# Patient Record
Sex: MALE | Race: BLACK OR AFRICAN AMERICAN
[De-identification: names, ages, dates, MRNs, and addresses within clinical notes are randomized per-mention and may not be internally consistent; named-entity substitution may affect disease eponyms.]

---

## 2018-09-30 ENCOUNTER — HOSPITAL ENCOUNTER (INPATIENT)
Dept: HOSPITAL 74 - YASAS | Age: 63
LOS: 1 days | Discharge: LEFT BEFORE DISCHARGE | DRG: 770 | End: 2018-10-01
Attending: INTERNAL MEDICINE | Admitting: INTERNAL MEDICINE
Payer: COMMERCIAL

## 2018-09-30 VITALS — BODY MASS INDEX: 21.8 KG/M2

## 2018-09-30 DIAGNOSIS — R00.0: ICD-10-CM

## 2018-09-30 DIAGNOSIS — F17.213: ICD-10-CM

## 2018-09-30 DIAGNOSIS — F10.230: Primary | ICD-10-CM

## 2018-09-30 DIAGNOSIS — F19.24: ICD-10-CM

## 2018-09-30 DIAGNOSIS — I10: ICD-10-CM

## 2018-09-30 DIAGNOSIS — D57.3: ICD-10-CM

## 2018-09-30 DIAGNOSIS — F32.9: ICD-10-CM

## 2018-09-30 DIAGNOSIS — F14.20: ICD-10-CM

## 2018-09-30 DIAGNOSIS — Z59.0: ICD-10-CM

## 2018-09-30 PROCEDURE — HZ2ZZZZ DETOXIFICATION SERVICES FOR SUBSTANCE ABUSE TREATMENT: ICD-10-PCS | Performed by: INTERNAL MEDICINE

## 2018-09-30 SDOH — ECONOMIC STABILITY - HOUSING INSECURITY: HOMELESSNESS: Z59.0

## 2018-09-30 NOTE — HP
Screened but not Admitted





- Documentation of Visit


Screened but not Admitted: No


Left Prior to Completion of Assessment: No


Insurance Authorization Denied: No


Patient Does Not Meet Criteria for Admission: No


Level of Care Recommended at this Time: Other


Additional Information/Explanation: Patient is seeking admission to detox from 

alcohol. He reports that he was in patient at [facility] for 2 days. 

Patient was brought in by [facility] with intravenous access on his left arm. Unable 

to verify with [facility] because access was not dated, signed or secured. Patient 

was picked up by [facility] to go back to [facility] and remove access.

## 2018-09-30 NOTE — HP
CIWA Score





- CIWA Score


Nausea/Vomiting: 3 (x 8)


Muscle Tremors: 4-Moderate,w/Arms Extend


Anxiety: 4-Mod. Anxious/Guarded


Agitation: 0-Normal Activity


Paroxysmal Sweats: 2


Orientation: 1-Uncertain about Date


Tacttile Disturbances: 0-None


Auditory Disturbances: 0-None


Visual Disturbances: 0-None


Headache: 3-Moderate


CIWA-Ar Total Score: 17





Admission ROS [facility]





- HPI


Chief Complaint: 





Alcohol withdrawal symptoms


Allergies/Adverse Reactions: 


 Allergies











Allergy/AdvReac Type Severity Reaction Status Date / Time


 


Pork/Porcine Containing Allergy   Verified 18 23:36





Products     











History of Present Illness: 





63 years old male with a long history of alcohol dependence is seeking 

admission to detox. Patient has been in detox at [facility] and reports 9 years of 

sobriety. He has medical history of hypertension, Headaches and depression. He 

denies suicide attempt and suicidal ideation  at this time.


Exam Limitations: No Limitations





- Ebola screening


Have you traveled outside of the country in the last 21 days: No (N)


Have you had contact with anyone from an Ebola affected area: No


Have you been sick,other than usual withdrawal symptoms: No


Do you have a fever: No





- Review of Systems


Constitutional: Chills, Malaise, Night Sweats, Weakness


EENT: reports: Blurred Vision


Respiratory: reports: No Symptoms reported


Cardiac: reports: No Symptoms Reported


GI: reports: Poor Appetite, Poor Fluid Intake, Vomiting, Abdominal cramping


: reports: No Symptoms Reported


Musculoskeletal: reports: Back Pain, Joint Pain, Muscle Pain


Integumentary: reports: Bruising, Dryness


Neuro: reports: Headache, Tremors


Endocrine: reports: Flushing


Hematology: reports: No Symptoms Reported


Psychiatric: reports: Anxious, Depressed


Other Systems: Reviewed and Negative





Patient History





- Patient Medical History


Hx Anemia: Yes (SICKLE CELL TRAIT)


Hx Asthma: No


Hx Chronic Obstructive Pulmonary Disease (COPD): No


Hx Cancer: No


Hx Cardiac Disorders: No


Hx Congestive Heart Failure: No


Hx Hypertension: Yes (Not on medication)


Hx Hypercholesterolemia: No


Hx Pacemaker: No


HX Cerebrovascular Accident: No


Hx Seizures: No


Hx Dementia: No


Hx Diabetes: No


Hx Gastrointestinal Disorders: No


Hx Liver Disease: No


Hx Genitourinary Disorders: No


Hx Sexually Transmitted Disorders: No


Hx Renal Disease (ESRD): No


Hx Thyroid Disease: No


Hx Human Immunodeficiency Virus (HIV): No (Negative 2018)


Hx Hepatitis C: No


Hx Depression: Yes (Not on medication)


Hx Suicide Attempt: No (Denies suicide attempt and suicidal ideation at this 

time)


Hx Bipolar Disorder: No


Hx Schizophrenia: No


Other Medical History: Headaches - Not on medication





- Patient Surgical History


Past Surgical History: No


Hx Neurologic Surgery: No


Hx Cataract Extraction: No


Hx Cardiac Surgery: No


Hx Lung Surgery: No


Hx Breast Surgery: No


Hx Breast Biopsy: No


Hx Abdominal Surgery: No


Hx Appendectomy: No


Hx Cholecystectomy: No


Hx Genitourinary Surgery: No


Hx  Section: No


Hx Orthopedic Surgery: No


Anesthesia Reaction: No





- PPD History


Previous Implant?: No (POSTIVE PPD. INH FOR 9 MONTHS )


Documented Results: Negative w/proof





- Reproductive History


Patient is a Female of Child Bearing Age (11 -55 yrs old): No (male)





- Smoking Cessation


Smoking history: Current every day smoker


Have you smoked in the past 12 months: Yes


Aproximately how many cigarettes per day: 10


Hx Chewing Tobacco Use: No


Initiated information on smoking cessation: Yes


'Breaking Loose' booklet given: 10/01/18





Family Disease History





- Family Disease History


Family Disease History: Diabetes: Mother (HTN), Heart Disease: Mother, Other: 

Father (ALCOHOLIC, OF CIRRHOSIS)





Admission Physical Exam [facility]





- Vital Signs


Vital Signs: 


 Vital Signs - 24 hr











  18





  22:12


 


Temperature 97.2 F L


 


Pulse Rate 120 H


 


Respiratory 22 H





Rate 


 


Blood Pressure 151/90














- Physical


General Appearance: Yes: Moderate Distress, Tremorous, Irritable, Sweating, 

Anxious


HEENTM: Yes: EOMI, Normal ENT Inspection, Normocephalic, Normal Voice, ANU


Respiratory: Yes: Lungs Clear, Normal Breath Sounds, No Respiratory Distress


Neck: Yes: Supple


Breast: Yes: Breast Exam Deferred


Cardiology: Yes: Tachycardia


Abdominal: Yes: Normal Bowel Sounds, Soft


Genitourinary: Yes: Within Normal Limits


Back: Yes: Normal Inspection


Musculoskeletal: Yes: Back pain, Joint Stiffness, Muscle Pain


Extremities: Yes: Tremors


Neurological: Yes: Alert, Normal Mood/Affect


Integumentary: Yes: Warm


Lymphatic: Yes: Within Normal Limits





- Diagnostic


(1) Alcohol dependence with uncomplicated withdrawal


Current Visit: Yes   Status: Chronic   





(2) Depression


Current Visit: Yes   Status: Chronic   


Qualifiers: 


   Depression Type: unspecified   Qualified Code(s): F32.9 - Major depressive 

disorder, single episode, unspecified   





(3) HTN (hypertension)


Current Visit: No   Status: Suspected   


Qualifiers: 


   Hypertension type: essential hypertension   Qualified Code(s): I10 - 

Essential (primary) hypertension   





(4) Nicotine dependence


Current Visit: No   Status: Acute   





Cleared for Admission [facility]





- Detox or Rehab


[facility] Level of Care: Medically Managed


Detox Regimen/Protocol: Librium





[facility] Breath Alcohol Content


Breath Alcohol Content: 0





Urine Drug Screen





- Results


Drug Screen Negative: No


Urine Drug Screen Results: THIERNO-Cocaine, BZO-Benzodiazepines

## 2018-10-01 VITALS — SYSTOLIC BLOOD PRESSURE: 133 MMHG | DIASTOLIC BLOOD PRESSURE: 71 MMHG | TEMPERATURE: 98.1 F | HEART RATE: 96 BPM

## 2018-10-01 NOTE — CONSULT
[facility] Psychiatric Consult





- Data


Date of interview: 10/01/18


Admission source: [facility]


Identifying data: Patient is a 63 year old single male. Patient refusing to 

provided additional identifying data.


Substance Abuse History: - Smoking Cessation.  Smoking history: Current every 

day smoker.  Have you smoked in the past 12 months: Yes.  Aproximately how many 

cigarettes per day: 10.  Hx Chewing Tobacco Use: No.  Initiated information on 

smoking cessation: Yes.  'Breaking Loose' booklet given: 10/01/18


Medical History: Anemia (sickle cell trait), Positive PPD. INH for 9 months


Psychiatric History: Patient irritable. He denies h/o psychiatric 

hospitalization, outpatient psychiatric care and suicide attempt.


Physical/Sexual Abuse/Trauma History: denies.





Mental Status Exam





- Mental Status Exam


Alert and Oriented to: Time, Place, Person


Cognitive Function: Good


Patient Appearance: Well Groomed


Mood: Irritable


Affect: Mood Congruent


Patient Behavior: Agitated


Speech Pattern: Clear


Voice Loudness: Normal


Thought Process: Intact, Goal Oriented


Thought Disorder: Not Present


Hallucinations: Denies


Suicidal Ideation: Denies


Homicidal Ideation: Denies


Insight/Judgement: Poor


Sleep: Poorly


Appetite: Fair


Muscle strength/Tone: Normal


Gait/Station: Normal





Psychiatric Findings





- Problem List (Axis 1, 2,3)


(1) Cocaine dependence


Current Visit: Yes   Status: Acute   





(2) Substance induced mood disorder


Current Visit: Yes   Status: Acute   





(3) Alcohol dependence with uncomplicated withdrawal


Current Visit: Yes   Status: Acute   





- Initial Treatment Plan


Initial Treatment Plan: Psychoeducation provided. Detoxification in progress. 

Observation.

## 2018-10-01 NOTE — PN
[facility] Progress Note (SOAP)


Subjective: 





sweat tremor restlessness


no abscess noted  patient lives in [place] with his wife


Objective: 





10/01/18 12:40


 Vital Signs











Temperature  98.1 F   10/01/18 09:15


 


Pulse Rate  96 H  10/01/18 09:15


 


Respiratory Rate  18   10/01/18 09:15


 


Blood Pressure  133/71   10/01/18 09:15


 


O2 Sat by Pulse Oximetry (%)      











10/01/18 12:41


lab noted

## 2018-10-01 NOTE — DS
[facility] Detox Discharge Summary


Admission Date: 


09/30/18





Discharge Date: 10/01/18





- History


Present History: Alcohol Dependence


Additional Comments: 





63 years old male admitted o 9/30/18 for alcohol withdrawal sx


patient insists to leave the detox unit wants to go home to his wife


patient does not have abscess patient  stated that he does not use opioid but 

alcohol


patient reported that he does no need detox he wants to go to rehab





- Physical Exam Results


Vital Signs: 


 Vital Signs











Temperature  98.1 F   10/01/18 09:15


 


Pulse Rate  96 H  10/01/18 09:15


 


Respiratory Rate  18   10/01/18 09:15


 


Blood Pressure  133/71   10/01/18 09:15


 


O2 Sat by Pulse Oximetry (%)      











Pertinent Admission Physical Exam Findings: 





alcohol withdrawal sx


 Vital Signs











Temperature  98.1 F   10/01/18 09:15


 


Pulse Rate  96 H  10/01/18 09:15


 


Respiratory Rate  18   10/01/18 09:15


 


Blood Pressure  133/71   10/01/18 09:15


 


O2 Sat by Pulse Oximetry (%)      








lab noted available





- Treatment


Hospital Course: Detox Protocol Followed, Responded well


Patient has Accepted a Rehab Referral to: [facility]





- Medication


Discharge Medications: 


Ambulatory Orders





Nifedipine ER [Procardia XL -] 30 mg PO DAILY #30 [id number] 08/24/15 











- Diagnosis


(1) Nicotine dependence


Current Visit: Yes   Status: Acute   


Qualifiers: 


   Nicotine product type: cigarettes   Substance use status: in withdrawal   

Qualified Code(s): F17.213 - Nicotine dependence, cigarettes, with withdrawal   





(2) HTN (hypertension)


Current Visit: Yes   Status: Chronic   


Qualifiers: 


   Hypertension type: essential hypertension   Qualified Code(s): I10 - 

Essential (primary) hypertension   





(3) Alcohol dependence with uncomplicated withdrawal


Current Visit: Yes   Status: Acute   





- AMA


Did Patient Leave Against Medical Advice: No

## 2018-10-02 NOTE — EKG
Test Reason : 

Blood Pressure : ***/*** mmHG

Vent. Rate : 097 BPM     Atrial Rate : 097 BPM

   P-R Int : 122 ms          QRS Dur : 080 ms

    QT Int : 366 ms       P-R-T Axes : 060 -11 014 degrees

   QTc Int : 464 ms

 

NORMAL SINUS RHYTHM

NORMAL ECG

NO PREVIOUS ECGS AVAILABLE

Confirmed by Santosh Thorpe MD ([id number]) on 10/2/2018 11:07:23 AM

 

Referred By:             Confirmed By:Santosh Thorpe MD

## 2019-07-29 ENCOUNTER — HOSPITAL ENCOUNTER (INPATIENT)
Dept: HOSPITAL 74 - YASAS | Age: 64
LOS: 2 days | Discharge: LEFT BEFORE DISCHARGE | DRG: 770 | End: 2019-07-31
Attending: SURGERY | Admitting: SURGERY
Payer: COMMERCIAL

## 2019-07-29 VITALS — BODY MASS INDEX: 21.5 KG/M2

## 2019-07-29 DIAGNOSIS — I10: ICD-10-CM

## 2019-07-29 DIAGNOSIS — D57.3: ICD-10-CM

## 2019-07-29 DIAGNOSIS — F17.213: ICD-10-CM

## 2019-07-29 DIAGNOSIS — C61: ICD-10-CM

## 2019-07-29 DIAGNOSIS — F19.24: ICD-10-CM

## 2019-07-29 DIAGNOSIS — F19.282: ICD-10-CM

## 2019-07-29 DIAGNOSIS — F10.230: Primary | ICD-10-CM

## 2019-07-29 PROCEDURE — HZ2ZZZZ DETOXIFICATION SERVICES FOR SUBSTANCE ABUSE TREATMENT: ICD-10-PCS | Performed by: ALLERGY & IMMUNOLOGY

## 2019-07-29 NOTE — HP
<Micaela Hernandez - Last Filed: 19 13:12>





CIWA Score


Nausea/Vomitin-Mild Nausea/No Vomiting


Muscle Tremors: 2


Anxiety: 2


Agitation: 1-Slight > Activity


Paroxysmal Sweats: 2


Orientation: 0-Oriented


Tacttile Disturbances: 1-Very Mild Itch/Numbness


Auditory Disturbances: 0-None


Visual Disturbances: 0-None


Headache: 3-Moderate


CIWA-Ar Total Score: 12





- Admission Criteria


[facility]AS Guidelines: Admission for Medically Managed Detox: 


Requires at least one of the followin. CIWA greater than 12


2. Seizures within the past 24 hours


3. Delirium tremens within the past 24 hours


4. Hallucinations within the past 24 hours


5. Acute intervention needed for co  occurring medical disorder


6. Acute intervention needed for co  occurring psychiatric disorder


7. Severe withdrawal that cannot be handled at a lower level of care (continued


    vomiting, continued diarrhea, abnormal vital signs) requiring intravenous


    medication and/or fluids


8. Pregnancy








Admission ROS [facility]





- [facility]


Chief Complaint: 


65 y/o M with PMH prostate CA, HTN, depression, sickle cell trait, who presents 

for alcohol detox. 


Allergies/Adverse Reactions: 


 Allergies











Allergy/AdvReac Type Severity Reaction Status Date / Time


 


Pork/Porcine Containing Allergy   Verified 19 10:52





Products     











History of Present Illness: 


65 y/o M with prostate CA, HTN, depression, sickle cell trait, who presents for 

alcohol detox. Per pt, he was at [facility] yesterday for alcohol detox (

received 1 dose of librium). While there, he had chest pain and generalized 

abdominal pain, thus underwent a CTAP. Was found to subsequently have prostate 

CA. He was told that he would need sx. States that he was referred to multiple 

centers for detox, but picked to come here. Last drink was 3 days ago. He had 1 

L of vodka. Drinks this amount daily. Has blacked out previously, however has 

never had alcohol withdrawal sz. Longest duration sobriety 2 days. When pt 

withdraws, he becomes nauseated, vomits, and becomes tremulous. Has drank for 

35 yrs because he "likes it." Not to mask any feelings, but does endorse 

increased stress in his life. Used cocaine 10 days ago, < $10 worth via 

smoking. Was used 1x, does not use regularly. Denies IVDA. No other drug use.





Was at detox at [facility] , , 2018.


Pt's goal is to complete alcohol detox and then have his prostate CA addressed 

via sx, oncology f/u. 





PMH: as above


PsxH: denies


meds: denies


allergies: pork/pork containing products - causes HTN


FH: sister- asthmatic (w/hx intubation), mother - alzheimer's dz


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 smokes 1/2 

ppd x 30 yrs. alcohol and cocaine use as above. denies other drug use.


Exam Limitations: No Limitations





- Ebola screening


Have you traveled outside of the country in the last 21 days: No


Have you had contact with anyone from an Ebola affected area: No


Have you been sick,other than usual withdrawal symptoms: No


Do you have a fever: No





- Review of Systems


Constitutional: Chills, Fever, Night Sweats, Unintentional Wgt. Loss (+15 pound 

weight loss since february)


EENT: reports: Blurred Vision


Respiratory: reports: Shortness of Breath


Cardiac: reports: Chest Tightness


GI: reports: Diarrhea, Nausea, Poor Appetite


: reports: No Symptoms Reported


Musculoskeletal: reports: No Symptoms Reported


Integumentary: reports: No Symptoms Reported


Neuro: reports: No Symptoms reported


Endocrine: reports: No Symptoms Reported


Hematology: reports: No Symptoms Reported


Psychiatric: reports: Orientated x3





Patient History





- Patient Medical History


Hx Anemia: Yes (SICKLE CELL TRAIT)


Hx Asthma: No


Hx Chronic Obstructive Pulmonary Disease (COPD): No


Hx Cancer: No


Hx Cardiac Disorders: No


Hx Congestive Heart Failure: No


Hx Hypertension: Yes (Not on medication)


Hx Hypercholesterolemia: No


Hx Pacemaker: No


HX Cerebrovascular Accident: No


Hx Seizures: No


Hx Dementia: No


Hx Diabetes: No


Hx Gastrointestinal Disorders: No


Hx Liver Disease: No


Hx Genitourinary Disorders: No


Hx Sexually Transmitted Disorders: No


Hx Renal Disease (ESRD): No


Hx Thyroid Disease: No


Hx Human Immunodeficiency Virus (HIV): No (Negative )


Hx Hepatitis C: No


Hx Depression: Yes (Not on medication)


Hx Suicide Attempt: No (Denies suicide attempt and suicidal ideation at this 

time)


Hx Bipolar Disorder: No


Hx Schizophrenia: No





- Patient Surgical History


Past Surgical History: No


Hx Neurologic Surgery: No


Hx Cataract Extraction: No


Hx Cardiac Surgery: No


Hx Lung Surgery: No


Hx Breast Surgery: No


Hx Breast Biopsy: No


Hx Abdominal Surgery: No


Hx Appendectomy: No


Hx Cholecystectomy: No


Hx Genitourinary Surgery: No


Hx  Section: No


Hx Orthopedic Surgery: No


Anesthesia Reaction: No





- PPD History


Documented Results: Positive w/o proof


PPD to be Administered?: No





- Reproductive History


Patient is a Female of Child Bearing Age (11 -55 yrs old): No





- Smoking Cessation


Smoking history: Current every day smoker


Have you smoked in the past 12 months: Yes


Aproximately how many cigarettes per day: 10


Hx Chewing Tobacco Use: No


Initiated information on smoking cessation: Yes


'Breaking Loose' booklet given: 19





- Substance & Tx. History


Hx Alcohol Use: Yes


Substance Use Type: Alcohol, Cocaine


Hx Substance Use Treatment: Yes ([facility] detox , , 2018 )





- Substances abused


  ** Alcohol


Substance route: Oral


Frequency: Daily


Amount used: 1 liter of vodka


Age of first use: 27


Date of last use: 19





  ** Cocaine


Substance route: Smoking


Frequency: 1-3 times last 30 days


Amount used: < 10 dollars worth 


Date of last use: 19





Family Disease History





- Family Disease History


Family Disease History: Diabetes: Mother (HTN, alzheimer's ), Heart Disease: 

Mother, Other: Father (ALCOHOLIC, OF CIRRHOSIS), Sister (sister, asthmatic)





Admission Physical Exam [facility]





- Vital Signs


Vital Signs: 


 Vital Signs - 24 hr











  19





  10:54 11:43


 


Temperature 97.1 F L 97.1 F L


 


Pulse Rate 84 84


 


Respiratory 18 18





Rate  


 


Blood Pressure 139/83 139/83














- Physical


General Appearance: Yes: Within Normal Limits, Other


HEENTM: Yes: Other (+lipoma on R forehead)


Respiratory: Yes: Wheezing, Expiration (+prolonged expiratory phase)


Neck: Yes: Supple


Breast: Yes: Breast Exam Deferred


Cardiology: Yes: Regular Rhythm, Regular Rate, S1, S2


Abdominal: Yes: Tenderness


Genitourinary: Yes: Within Normal Limits


Back: Yes: Within Normal Limits


Musculoskeletal: Yes: Within Normal Limits


Extremities: Yes: Within Normal Limits


Neurological: Yes: CNs II-XII NML intact


Integumentary: Yes: Within Normal Limits





Cleared for Admission S





- Detox or Rehab


[facility] Level of Care: Medically Managed


Detox Regimen/Protocol: Librium





Breathalyzer





- Breathalyzer


Breathalyzer: 0





Urine Drug Screen





- Test Device


Lot number: [id number]


Expiration date: 21





- Control


Is test valid?: Yes





- Results


Drug screen NEGATIVE: No


Urine drug screen results: BZO-Benzodiazepines





Inpatient Rehab Admission





- Rehab Decision to Admit


Inpatient rehab admission?: No





<Caden Dash - Last Filed: 19 14:07>





CIWA Score





- Admission Criteria


[facility] Guidelines: Admission for Medically Managed Detox: 


Requires at least one of the followin. CIWA greater than 12


2. Seizures within the past 24 hours


3. Delirium tremens within the past 24 hours


4. Hallucinations within the past 24 hours


5. Acute intervention needed for co  occurring medical disorder


6. Acute intervention needed for co  occurring psychiatric disorder


7. Severe withdrawal that cannot be handled at a lower level of care (continued


    vomiting, continued diarrhea, abnormal vital signs) requiring intravenous


    medication and/or fluids


8. Pregnancy








Admission Physical Exam [facility]





- Vital Signs


Vital Signs: 


 Vital Signs - 24 hr











  19





  10:54 11:43


 


Temperature 97.1 F L 97.1 F L


 


Pulse Rate 84 84


 


Respiratory 18 18





Rate  


 


Blood Pressure 139/83 139/83

## 2019-07-29 NOTE — PN
[facility]HS Progress Note


Note: 





Patient was reviewed with resident and qualified for detox.  


See full history and assessment.  All labs reviewed and medications reviewed 

and appropriate.


Dr. Dash

## 2019-07-30 RX ADMIN — Medication SCH TAB: at 10:20

## 2019-07-30 NOTE — PN
S CIWA





- CIWA Score


Nausea/Vomitin


Muscle Tremors: 3


Anxiety: 2


Agitation: 0-Normal Activity


Paroxysmal Sweats: No Perspiration


Orientation: 0-Oriented


Tacttile Disturbances: 1-Very Mild Itch/Numbness


Auditory Disturbances: 0-None


Visual Disturbances: 1-Very Mild Sensitivity


Headache: 3-Moderate


CIWA-Ar Total Score: 15





BHS Progress Note (SOAP)


Subjective: 





Tremors, H/A, Stomach Cramping, Vomiting.


Objective: 


PATIENT A & O X 3, OBSERVED AMBULATING ON UNIT UNASSISTED. IN NO ACUTE DISTRESS.





19 14:31





 Vital Signs











Temperature  97.7 F   19 09:07


 


Pulse Rate  96 H  19 09:07


 


Respiratory Rate  18   19 09:07


 


Blood Pressure  155/85   19 09:07


 


O2 Sat by Pulse Oximetry (%)      











DESPITE ENCOURAGEMENT FROM NP, PATIENT REFUSED TO HAVE ADMISSION LABS DRAWN 

THIS AM, NOTING THAT IS IS GENERALLY VERY DIFFICULT FOR ANYONE TO DRAW BLOOD  

ON HIM.








19 14:34


Assessment: 





19 14:34


WITHDRAWAL SYMPTOMS.


Plan: 





CONTINUE DETOX.


INCREASE DAILY PO WATER INTAKE.


PRN ZOFRAN SL FOR NAUSEA/VOMITING.





PATIENT REPORTS HISTORY OF INTERMITTENT ELEVATIONS IN BLOOD PRESSURE. PATIENT 

DENIES HISTORY OF MEDICAL TREATMENT FOR HYPERTENSION. WILL CONTINUE TO MONITOR 

BLOOD PRESSURE.

## 2019-07-30 NOTE — CONSULT
[facility] Psychiatric Consult





- Data


Date of interview: 07/30/19


Admission source: [facility]


Identifying data: Patient is a 64 year old single male, father of two, domiciled

, and is currently employed. This is one of multiple admissions for patient. 

Patient admitted to  for alcohol and cocaine dependence.


Substance Abuse History: Smoking Cessation.  Smoking history: Current every day 

smoker.  Have you smoked in the past 12 months: Yes.  Aproximately how many 

cigarettes per day: 10.  Hx Chewing Tobacco Use: No.  Initiated information on 

smoking cessation: Yes.  'Breaking Loose' booklet given: 07/29/19.  - Substance 

& Tx. History.  Hx Alcohol Use: Yes.  Substance Use Type: Alcohol, Cocaine.  Hx 

Substance Use Treatment: Yes ([facility] detox 2015, 2017, 2018 ).  - Substances 

abused.  ** Alcohol.  Substance route: Oral.  Frequency: Daily.  Amount used: 1 

liter of vodka.  Age of first use: 27.  Date of last use: 07/27/19.  ** 

Cocaine.  Substance route: Smoking.  Frequency: 1-3 times last 30 days.  Amount 

used: < 10 dollars worth.  Date of last use: 07/19/19


Medical History: anemia, hypertension


Psychiatric History: Patient denies h/o psychiatric hospitalization, outpatient 

care and suicide attempt. At present patient reports feeling sad and is 

experiencing difficulty sleeping but is motivated to complete detox.


Physical/Sexual Abuse/Trauma History: denies.





Mental Status Exam





- Mental Status Exam


Alert and Oriented to: Time, Place, Person


Cognitive Function: Good


Patient Appearance: Well Groomed


Mood: Sad


Affect: Mood Congruent


Patient Behavior: Cooperative


Speech Pattern: Appropriate


Voice Loudness: Normal


Thought Process: Goal Oriented


Thought Disorder: Not Present


Hallucinations: Denies


Suicidal Ideation: Denies


Homicidal Ideation: Denies


Insight/Judgement: Poor


Sleep: Poorly


Appetite: Fair


Muscle strength/Tone: Normal


Gait/Station: Normal





Psychiatric Findings





- Problem List (Axis 1, 2,3)


(1) Alcohol dependence


Current Visit: Yes   Status: Acute   





(2) Cocaine dependence


Current Visit: Yes   Status: Acute   





(3) Substance induced mood disorder


Current Visit: Yes   Status: Acute   





(4) Substance-induced sleep disorder


Current Visit: Yes   Status: Acute   





- Initial Treatment Plan


Initial Treatment Plan: Psychoeducation provided. Detoxification in progress. 

Will order Trazodone 50mg HS. Patient reports favorable effects from accepting 

trazodone in the past for insomnia. Benefits and side effects discussed. Verbal 

consent given.

## 2019-07-31 VITALS — SYSTOLIC BLOOD PRESSURE: 132 MMHG | TEMPERATURE: 97.7 F | DIASTOLIC BLOOD PRESSURE: 84 MMHG | HEART RATE: 68 BPM

## 2019-07-31 RX ADMIN — Medication SCH: at 11:25

## 2019-07-31 NOTE — DS
[facility] Detox Discharge Summary


Admission Date: 


07/29/19





Discharge Date: 07/31/19





- History


Present History: Alcohol Dependence


Additional Comments: 





64 years old male admitted on 07/29/19 for acute alcohol withdrawal sx 

management


reported feeling better today wants to leave the detox and go to alcohol rehab 

today


insists to leave the detox unit today "can not wait for tomorrow"


patient wants to return to work as soon as possible 


patient is alert oriented x 3 denies dizziness no shortness of breath


"I have place to go to"


Pertinent Past History: 





patient considers bringing in medication list to primary care provider who 

monitoring his bp 





- Physical Exam Results


Vital Signs: 


 Vital Signs











Temperature  97.7 F   07/31/19 09:29


 


Pulse Rate  68   07/31/19 09:29


 


Respiratory Rate  18   07/31/19 09:29


 


Blood Pressure  132/84   07/31/19 09:29


 


O2 Sat by Pulse Oximetry (%)      











Pertinent Admission Physical Exam Findings: 





alcohol withdrawal sx


no lab result found by the writer





- Treatment


Hospital Course: Detox Protocol Followed, Responded well


Patient has Accepted a Rehab Referral to: [facility] support approach





- Medication


Discharge Medications: 


Ambulatory Orders





NK [No Known Home Medication]  07/29/19 











- Diagnosis


(1) Substance induced mood disorder


Current Visit: Yes   Status: Suspected   





(2) Alcohol dependence with uncomplicated withdrawal


Current Visit: Yes   Status: Acute   





(3) Nicotine dependence


Current Visit: Yes   Status: Acute   


Qualifiers: 


   Nicotine product type: cigarettes   Substance use status: in withdrawal   

Qualified Code(s): F17.213 - Nicotine dependence, cigarettes, with withdrawal   





(4) HTN (hypertension)


Current Visit: Yes   Status: Chronic   


Qualifiers: 


   Hypertension type: essential hypertension   Qualified Code(s): I10 - 

Essential (primary) hypertension   





- AMA


Did Patient Leave Against Medical Advice: Yes